# Patient Record
Sex: FEMALE | Race: BLACK OR AFRICAN AMERICAN
[De-identification: names, ages, dates, MRNs, and addresses within clinical notes are randomized per-mention and may not be internally consistent; named-entity substitution may affect disease eponyms.]

---

## 2017-02-22 ENCOUNTER — HOSPITAL ENCOUNTER (OUTPATIENT)
Dept: HOSPITAL 84 - D.OPS | Age: 42
Discharge: HOME | End: 2017-02-22
Attending: OBSTETRICS & GYNECOLOGY
Payer: MEDICAID

## 2017-02-22 VITALS — WEIGHT: 167 LBS | BODY MASS INDEX: 24.73 KG/M2 | HEIGHT: 69 IN | BODY MASS INDEX: 24.73 KG/M2

## 2017-02-22 VITALS — DIASTOLIC BLOOD PRESSURE: 69 MMHG | SYSTOLIC BLOOD PRESSURE: 118 MMHG

## 2017-02-22 DIAGNOSIS — N92.0: Primary | ICD-10-CM

## 2017-02-22 DIAGNOSIS — D26.1: ICD-10-CM

## 2017-02-22 DIAGNOSIS — F17.200: ICD-10-CM

## 2017-02-22 LAB
BASOPHILS NFR BLD AUTO: 0.2 % (ref 0–2)
EOSINOPHIL NFR BLD: 2.1 % (ref 0–7)
ERYTHROCYTE [DISTWIDTH] IN BLOOD BY AUTOMATED COUNT: 12.5 % (ref 11.5–14.5)
HCG UR QL: NEGATIVE
HCT VFR BLD CALC: 33.1 % (ref 36–48)
HGB BLD-MCNC: 10.9 G/DL (ref 12–16)
IMM GRANULOCYTES NFR BLD: 0.2 % (ref 0–5)
LYMPHOCYTES NFR BLD AUTO: 33.8 % (ref 15–50)
MCH RBC QN AUTO: 32 PG (ref 26–34)
MCHC RBC AUTO-ENTMCNC: 32.9 G/DL (ref 31–37)
MCV RBC: 97.1 FL (ref 80–100)
MONOCYTES NFR BLD: 7.9 % (ref 2–11)
NEUTROPHILS NFR BLD AUTO: 55.8 % (ref 40–80)
PLATELET # BLD: 295 10X3/UL (ref 130–400)
PMV BLD AUTO: 9.1 FL (ref 7.4–10.4)
RBC # BLD AUTO: 3.41 10X6/UL (ref 4–5.4)
WBC # BLD AUTO: 8.5 10X3/UL (ref 4.8–10.8)

## 2017-02-22 NOTE — NUR
1545 DC INSTS. REVIEWED, VOICED UNDERSTANDING, RX'S PERCOCET AND
MOTRIN GIVEN, RELEASED IN WC WITH ESCORT. SON AS  HOME.

## 2017-02-22 NOTE — HP
PATIENT: GARCIA TAMAYO                                 MEDICAL RECORD: [id number]
ACCOUNT: [id number]                                    LOCATION:[facility]         
: 71                                            ADMISSION DATE: 17
                                                         
 
                             HISTORY AND PHYSICAL EXAMINATION
 
 
HISTORY OF PRESENT ILLNESS:  The patient is a 41-year-old white female with
abnormal bleeding, myomas, pelvic pain.  She desires endometrial ablation for
relief of abnormal bleeding.  She has undergone endometrial biopsy with benign
results.  Pelvic ultrasound revealing multiple myomas and uterine length of 10
cm, endometrial biopsies, read as weakly proliferative.
 
PAST MEDICAL HISTORY:
DRUG ALLERGIES:  None known.
 
CURRENT MEDICATIONS:  Depo-Provera for birth control.
 
FAMILY HISTORY:  Noncontributory.
 
REVIEW OF SYSTEMS:  No chest pain, no dyspnea.  Positive for abnormal bleeding.
 
SOCIAL HISTORY:  The patient is single.  Occasional ethanol use.  Smoker.
 
PHYSICAL EXAMINATION:
VITAL SIGNS:  Weight is 172 pounds, blood pressure 110/60.
HEENT:  Unremarkable.
LUNGS:  Clear.
HEART:  Regular rate and rhythm.
ABDOMEN:  Soft.
PELVIC:  Reveals uterus consistent with the ultrasound measurement.  No adnexal
masses.  Normal Pap smear.
EXTREMITIES:  No cyanosis, clubbing or edema.
NEUROLOGIC:  Grossly intact.
 
IMPRESSION:  Abnormal uterine bleeding and myomas.  I have discussed with the
patient the treatment with hysterectomy versus endometrial ablation.  She
desires endometrial ablation.  She understands that results may vary with
myomas, desires this rather than a long recuperated process that would be
necessary with hysterectomy.  All questions answered.  Discussed risks of
procedure, anesthesia, infection, bleeding, perforation, injury to other organs.
 
PLAN:  Endometrial ablation in the a.m. HTA versus the NovaSure.
 
TRANSINT:[id number] Voice Confirmation ID: [id number] DOCUMENT ID: [id number]
 
 
                                           
                                           LORENA GILLIS MD             
 
 
 
Electronically Signed by LORENA GILLIS on 17 at 0841
CC:                                                             [id number]
DICTATION DATE: 17 1319     :     17 1432      [id number] [place] [address]

## 2017-02-27 NOTE — OP
PATIENT NAME:  GARCIA TAMAYO                           MEDICAL RECORD: [id number]
:71                                             LOCATION:[facility]          
                                                         ADMISSION DATE:        
SURGEON:  ROSSANA GILLIS MD             
 
 
DATE OF OPERATION:  2017
 
PREOPERATIVE DIAGNOSES:  Menorrhagia and myomas.
 
POSTOPERATIVE DIAGNOSES:  Menorrhagia and myomas.
 
PROCEDURE:  Hydrothermal endometrial ablation.
 
SURGEON:  Rossana Gillis MD
 
ANESTHESIA:  General.
 
FINDINGS:  A 10-week size myomatous uterus.
 
ESTIMATED BLOOD LOSS:  Minimal.
 
COMPLICATIONS OF SURGERY:  None.
 
OPERATIVE NOTE:  The patient was taken to the OR and under adequate general
anesthesia, prepped and draped in the usual manner for vaginal procedures with
legs in floating boot Mahesh stirrups.  The [facility] hydrothermal endometrial ablation
device was used to perform this procedure.  The tenaculum was placed on the
cervix.  The cervix was progressively dilated to accept the hydrothermal device,
which was then inserted and under protocol, the endometrium was evaluated.  No
new findings and a 10-minute hydrothermal burn was achieved, followed by cool
down.  At the end of procedure, all instruments were removed according to
protocol and the patient went to the recovery area in good condition with no
bleeding.
 
TRANSINT:[id number] Voice Confirmation ID: [id number] DOCUMENT ID: [id number]
                                           
                                           ROSSANA GILLIS MD             
 
 
 
Electronically Signed by ROSSANA GILLIS on 17 at 0808
 
 
 
 
 
 
 
 
CC:                                                             [id number]
DICTATION DATE: 17 1301     :     17 1613      [facility] 
                                                                      17
[address] [address]

## 2017-04-06 ENCOUNTER — HOSPITAL ENCOUNTER (EMERGENCY)
Dept: HOSPITAL 84 - D.ER | Age: 42
Discharge: HOME | End: 2017-04-06
Payer: MEDICAID

## 2017-04-06 VITALS — BODY MASS INDEX: 24.7 KG/M2

## 2017-04-06 DIAGNOSIS — B34.9: Primary | ICD-10-CM

## 2017-04-06 LAB
APPEARANCE UR: (no result)
BACTERIA #/AREA URNS HPF: (no result) /HPF
BASOPHILS NFR BLD AUTO: 0.2 % (ref 0–2)
BILIRUB SERPL-MCNC: NEGATIVE MG/DL
COLOR UR: YELLOW
EOSINOPHIL NFR BLD: 2.6 % (ref 0–7)
ERYTHROCYTE [DISTWIDTH] IN BLOOD BY AUTOMATED COUNT: 13.1 % (ref 11.5–14.5)
GLUCOSE SERPL-MCNC: NEGATIVE MG/DL
HCT VFR BLD CALC: 39 % (ref 36–48)
HGB BLD-MCNC: 12.9 G/DL (ref 12–16)
IMM GRANULOCYTES NFR BLD: 0.2 % (ref 0–5)
KETONES UR STRIP-MCNC: NEGATIVE MG/DL
LEUKOCYTE ESTERASE: (no result)
LYMPHOCYTES NFR BLD AUTO: 8.7 % (ref 15–50)
MCH RBC QN AUTO: 32.3 PG (ref 26–34)
MCHC RBC AUTO-ENTMCNC: 33.1 G/DL (ref 31–37)
MCV RBC: 97.5 FL (ref 80–100)
MONOCYTES NFR BLD: 9.5 % (ref 2–11)
NEUTROPHILS NFR BLD AUTO: 78.8 % (ref 40–80)
NITRITE UR-MCNC: NEGATIVE MG/ML
PH UR STRIP: 5 [PH] (ref 5–6)
PLATELET # BLD: 307 10X3/UL (ref 130–400)
PMV BLD AUTO: 9.3 FL (ref 7.4–10.4)
PROT UR-MCNC: (no result) MG/DL
RBC # BLD AUTO: 4 10X6/UL (ref 4–5.4)
RBC #/AREA URNS HPF: (no result) /HPF (ref 0–5)
SP GR UR STRIP: 1.02 (ref 1–1.02)
SQUAMOUS #/AREA URNS HPF: (no result) /HPF (ref 0–5)
UROBILINOGEN UR-MCNC: NORMAL MG/DL
WBC # BLD AUTO: 10.6 10X3/UL (ref 4.8–10.8)
WBC #/AREA URNS HPF: (no result) /HPF (ref 0–5)

## 2018-05-08 ENCOUNTER — HOSPITAL ENCOUNTER (EMERGENCY)
Dept: HOSPITAL 84 - D.ER | Age: 43
Discharge: HOME | End: 2018-05-08
Payer: MEDICAID

## 2018-05-08 VITALS — BODY MASS INDEX: 24.7 KG/M2

## 2018-05-08 DIAGNOSIS — S80.02XA: Primary | ICD-10-CM
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
## 2018-05-08 DIAGNOSIS — M25.462: ICD-10-CM

## 2018-06-22 ENCOUNTER — HOSPITAL ENCOUNTER (OUTPATIENT)
Dept: HOSPITAL 84 - D.MRI | Age: 43
Discharge: HOME | End: 2018-06-22
Attending: NURSE PRACTITIONER
Payer: MEDICAID

## 2018-06-22 VITALS — BODY MASS INDEX: 24.7 KG/M2

## 2018-06-22 DIAGNOSIS — S80.02XD: Primary | ICD-10-CM

## 2019-10-08 ENCOUNTER — HOSPITAL ENCOUNTER (EMERGENCY)
Dept: HOSPITAL 84 - D.ER | Age: 44
Discharge: HOME | End: 2019-10-08
Payer: SELF-PAY

## 2019-10-08 VITALS — SYSTOLIC BLOOD PRESSURE: 138 MMHG | DIASTOLIC BLOOD PRESSURE: 81 MMHG

## 2019-10-08 VITALS
HEIGHT: 69 IN | BODY MASS INDEX: 25.23 KG/M2 | HEIGHT: 69 IN | WEIGHT: 170.36 LBS | WEIGHT: 170.36 LBS | BODY MASS INDEX: 25.23 KG/M2

## 2019-10-08 DIAGNOSIS — R10.32: Primary | ICD-10-CM

## 2019-10-08 LAB
ALBUMIN SERPL-MCNC: 3.9 G/DL (ref 3.4–5)
ALP SERPL-CCNC: 69 U/L (ref 46–116)
ALT SERPL-CCNC: 21 U/L (ref 10–68)
AMYLASE SERPL-CCNC: 72 U/L (ref 25–115)
ANION GAP SERPL CALC-SCNC: 11.4 MMOL/L (ref 8–16)
APPEARANCE UR: CLEAR
BASOPHILS NFR BLD AUTO: 0.1 % (ref 0–2)
BILIRUB SERPL-MCNC: 0.58 MG/DL (ref 0.2–1.3)
BILIRUB SERPL-MCNC: NEGATIVE MG/DL
BUN SERPL-MCNC: 12 MG/DL (ref 7–18)
CALCIUM SERPL-MCNC: 9.2 MG/DL (ref 8.5–10.1)
CHLORIDE SERPL-SCNC: 105 MMOL/L (ref 98–107)
CO2 SERPL-SCNC: 28.5 MMOL/L (ref 21–32)
COLOR UR: YELLOW
CREAT SERPL-MCNC: 0.8 MG/DL (ref 0.6–1.3)
EOSINOPHIL NFR BLD: 1.8 % (ref 0–7)
ERYTHROCYTE [DISTWIDTH] IN BLOOD BY AUTOMATED COUNT: 13 % (ref 11.5–14.5)
GLOBULIN SER-MCNC: 3.5 G/L
GLUCOSE SERPL-MCNC: 88 MG/DL (ref 74–106)
GLUCOSE SERPL-MCNC: NEGATIVE MG/DL
HCT VFR BLD CALC: 38.8 % (ref 36–48)
HGB BLD-MCNC: 12.8 G/DL (ref 12–16)
IMM GRANULOCYTES NFR BLD: 0.3 % (ref 0–5)
KETONES UR STRIP-MCNC: NEGATIVE MG/DL
LIPASE SERPL-CCNC: 161 U/L (ref 73–393)
LYMPHOCYTES NFR BLD AUTO: 33.4 % (ref 15–50)
MCH RBC QN AUTO: 33.1 PG (ref 26–34)
MCHC RBC AUTO-ENTMCNC: 33 G/DL (ref 31–37)
MCV RBC: 100.3 FL (ref 80–100)
MONOCYTES NFR BLD: 8.4 % (ref 2–11)
NEUTROPHILS NFR BLD AUTO: 56 % (ref 40–80)
NITRITE UR-MCNC: NEGATIVE MG/ML
OSMOLALITY SERPL CALC.SUM OF ELEC: 279 MOSM/KG (ref 275–300)
PH UR STRIP: 8 [PH] (ref 5–6)
PLATELET # BLD: 328 10X3/UL (ref 130–400)
PMV BLD AUTO: 9.3 FL (ref 7.4–10.4)
POTASSIUM SERPL-SCNC: 3.9 MMOL/L (ref 3.5–5.1)
PROT SERPL-MCNC: 7.4 G/DL (ref 6.4–8.2)
PROT UR-MCNC: NEGATIVE MG/DL
RBC # BLD AUTO: 3.87 10X6/UL (ref 4–5.4)
SODIUM SERPL-SCNC: 141 MMOL/L (ref 136–145)
SP GR UR STRIP: 1.01 (ref 1–1.02)
TROPONIN I SERPL-MCNC: < 0.017 NG/ML (ref 0–0.06)
UROBILINOGEN UR-MCNC: NORMAL MG/DL
WBC # BLD AUTO: 10.6 10X3/UL (ref 4.8–10.8)

## 2019-11-08 ENCOUNTER — HOSPITAL ENCOUNTER (OUTPATIENT)
Dept: HOSPITAL 84 - D.MAMMO | Age: 44
Discharge: HOME | End: 2019-11-08
Attending: NURSE PRACTITIONER
Payer: MEDICAID

## 2019-11-08 VITALS — BODY MASS INDEX: 25.1 KG/M2

## 2019-11-08 DIAGNOSIS — Z12.31: Primary | ICD-10-CM

## 2019-11-27 ENCOUNTER — HOSPITAL ENCOUNTER (OUTPATIENT)
Dept: HOSPITAL 84 - D.MAMMO | Age: 44
Discharge: HOME | End: 2019-11-27
Attending: NURSE PRACTITIONER
Payer: MEDICAID

## 2019-11-27 VITALS — BODY MASS INDEX: 25.1 KG/M2

## 2019-11-27 DIAGNOSIS — R92.2: Primary | ICD-10-CM

## 2020-07-24 ENCOUNTER — HOSPITAL ENCOUNTER (OUTPATIENT)
Dept: HOSPITAL 84 - D.US | Age: 45
Discharge: HOME | End: 2020-07-24
Attending: NURSE PRACTITIONER
Payer: MEDICAID

## 2020-07-24 VITALS — BODY MASS INDEX: 25.1 KG/M2

## 2020-07-24 DIAGNOSIS — R10.9: Primary | ICD-10-CM

## 2020-07-24 DIAGNOSIS — Z87.898: ICD-10-CM

## 2020-07-31 ENCOUNTER — HOSPITAL ENCOUNTER (OUTPATIENT)
Dept: HOSPITAL 84 - D.CT | Age: 45
Discharge: HOME | End: 2020-07-31
Attending: NURSE PRACTITIONER
Payer: MEDICAID

## 2020-07-31 VITALS — BODY MASS INDEX: 25.1 KG/M2

## 2020-07-31 DIAGNOSIS — D18.09: Primary | ICD-10-CM

## 2020-08-24 ENCOUNTER — HOSPITAL ENCOUNTER (OUTPATIENT)
Dept: HOSPITAL 84 - D.MRI | Age: 45
Discharge: HOME | End: 2020-08-24
Attending: FAMILY MEDICINE
Payer: MEDICAID

## 2020-08-24 VITALS — BODY MASS INDEX: 25.1 KG/M2

## 2020-08-24 DIAGNOSIS — D18.09: Primary | ICD-10-CM

## 2021-06-07 LAB
BASOPHILS NFR BLD AUTO: 0.6 % (ref 0–2)
EOSINOPHIL NFR BLD: 1 % (ref 0–7)
ERYTHROCYTE [DISTWIDTH] IN BLOOD BY AUTOMATED COUNT: 13.5 % (ref 11.5–14.5)
HCT VFR BLD CALC: 39.6 % (ref 36–48)
HGB BLD-MCNC: 13.1 G/DL (ref 12–16)
LYMPHOCYTES # BLD: 3 10X3/UL (ref 1.18–3.74)
LYMPHOCYTES NFR BLD AUTO: 37.5 % (ref 15–50)
MCH RBC QN AUTO: 33 PG (ref 26–34)
MCHC RBC AUTO-ENTMCNC: 33.1 G/DL (ref 31–37)
MCV RBC: 99.9 FL (ref 80–100)
MONOCYTES NFR BLD: 8.2 % (ref 2–11)
NEUTROPHILS # BLD: 4.2 10X3/UL (ref 1.56–6.13)
NEUTROPHILS NFR BLD AUTO: 52.7 % (ref 40–80)
PLATELET # BLD: 364 10X3/UL (ref 130–400)
PMV BLD AUTO: 7.1 FL (ref 7.4–10.4)
RBC # BLD AUTO: 3.96 10X6/UL (ref 4–5.4)
WBC # BLD AUTO: 8 10X3/UL (ref 4.8–10.8)

## 2021-06-10 ENCOUNTER — HOSPITAL ENCOUNTER (OUTPATIENT)
Dept: HOSPITAL 84 - D.OPS | Age: 46
Discharge: HOME | End: 2021-06-10
Attending: OBSTETRICS & GYNECOLOGY
Payer: COMMERCIAL

## 2021-06-10 VITALS — DIASTOLIC BLOOD PRESSURE: 76 MMHG | SYSTOLIC BLOOD PRESSURE: 129 MMHG

## 2021-06-10 VITALS — BODY MASS INDEX: 26.07 KG/M2 | HEIGHT: 69 IN | WEIGHT: 176 LBS

## 2021-06-10 DIAGNOSIS — D25.9: ICD-10-CM

## 2021-06-10 DIAGNOSIS — N93.9: Primary | ICD-10-CM

## 2021-06-10 LAB — HCG UR QL: NEGATIVE

## 2021-06-10 NOTE — NUR
THIS NURSE CALLED OR, SPOKE TO TIFFANIE TO NOTIFY DR BANDA THAT
THERE ARE NO DISCHARGE ORDERS IN THE COMPUTER AND PATIENT IS IN PAIN
AND HAS NO PRESCRIPTION FOR MEDICATION.

## 2021-06-10 NOTE — OP
PATIENT NAME:  GARCIA TAMAYO                           MEDICAL RECORD: [id number]
:71                                             LOCATION:[facility]          
                                                         ADMISSION DATE:        
SURGEON:  NAPOLEON AVILES DO            
 
 
DATE OF OPERATION:  06/10/2021
 
PREOPERATIVE DIAGNOSES:  Abnormal uterine bleeding, fibroid uterus, history of
ablation.
 
POSTOPERATIVE DIAGNOSES:  Abnormal uterine bleeding, fibroid uterus, history of
ablation.
 
PRIMARY SURGEON:  Napoleon Aviles DO
 
ANESTHESIA:  LMA.
 
PROCEDURE:  Hysteroscopy, D&C.
 
FINDINGS:  Uterus sounded to 7 cm.  On hysteroscopy, cavity with scar tissue,
which was not really expanded with saline influx.  No polyps noted.  Fluid
deficit of 180 mL.  Bag suction not working, so we used normal suction to
calculate fluid difference.
 
SPECIMENS:  Endometrial curetting.
 
ESTIMATED BLOOD LOSS:  10 mL.
 
IV FLUIDS:  Per anesthesia.
 
URINE OUTPUT:  100 mL clear yellow urine via red rubber catheter.
 
COMPLICATIONS:  None.
 
DESCRIPTION OF PROCEDURE:  Prior to procedure, risks of surgery including
bleeding, pain, infection, damage to surrounding structures, uterine
perforation, VTE and reoperation discussed with the patient.  Also reviewed,
depending on extent of scar tissue and prior ablation may not be able to
re-ablate.  The patient expressed understanding.  Consent signed in the office. 
All questions answered preoperatively and the patient was taken to the operating
room where anesthesia was administered and found to be adequate.  She was
prepped and draped in normal sterile fashion in the dorsal lithotomy position. 
Speculum was placed.  A tenaculum used to grasp the anterior lip of the cervix
and cervix dilated to accommodate Aveta hysteroscope.  Hysteroscope demonstrated
scar tissue and no polyps or large amount of abnormal tissue noted. 
Hysteroscope removed and cervix further dilated to accommodate a small sharp
curette, curetted in a clockwise fashion until gritty feeling was noted. 
Curette removed.  Hemostasis adequate.  Tenaculum removed.  Silver nitrate
applied.  Due to scar tissue, ablation would not be a safe next step so
procedure terminated after a D&C portion.  Hemostasis was adequate.  Instruments
removed from vagina.  The patient was awakened and tolerated the procedure well,
was stable, awakened and taken to recovery room in stable condition.
 
TRANSINT:[id number] Voice Confirmation ID: [id number] DOCUMENT ID: [id number]
                                           
 
 
 
OPERATIVE REPORT                               [id number]    GARCIA TAMAYO         
 
 
                                           NAPOLEON AVILES DO            
 
 
 
 
 
 
 
 
 
 
 
 
 
 
 
 
 
 
 
 
 
 
 
 
 
 
 
 
 
 
 
 
 
 
 
 
 
 
 
 
 
 
 
 
 
 
CC:                                                             [id number]
DICTATION DATE: 21 0855     :     21 1205      [facility] 
                                                                      06/10/21
[facility]                                          
[address]                           
[place] [address]

## 2021-06-10 NOTE — NUR
FLUID DEFICIT OF 180ML NOTED; SURGEON AWARE. FLUID SUCTIONED INTO
MEASURABLE CONTAINMENT. NOTED,KATE,RN

## 2021-06-10 NOTE — NUR
0935 DC TEACHING COMPLETE, PT VERBALIZED UNDERSTANDING, VOIDED TWO
TIMES AFTER SURGERY.  NO PRESCRIPTION IN CHART.
0955 PIV REMOVED, CATHETER INTACT, PT GETTING DRESSED.
1005 PT DC'D VIA WC ACCOMPANIED BY MOLINA TO POV WITH ALL BELONGINGS
AND DC PACKET.   DRIVING